# Patient Record
Sex: MALE | URBAN - METROPOLITAN AREA
[De-identification: names, ages, dates, MRNs, and addresses within clinical notes are randomized per-mention and may not be internally consistent; named-entity substitution may affect disease eponyms.]

---

## 2021-09-03 ENCOUNTER — OFFICE VISIT (OUTPATIENT)
Dept: URBAN - METROPOLITAN AREA CLINIC 75 | Facility: CLINIC | Age: 36
End: 2021-09-03
Payer: COMMERCIAL

## 2021-09-03 DIAGNOSIS — H01.002 BLEPHARITIS OF RIGHT LOWER EYELID: Primary | ICD-10-CM

## 2021-09-03 DIAGNOSIS — H01.005 BLEPHARITIS OF LEFT LOWER EYELID: ICD-10-CM

## 2021-09-03 DIAGNOSIS — H52.223 REGULAR ASTIGMATISM, BILATERAL: ICD-10-CM

## 2021-09-03 PROCEDURE — 99204 OFFICE O/P NEW MOD 45 MIN: CPT | Performed by: OPTOMETRIST

## 2021-09-03 ASSESSMENT — VISUAL ACUITY
OD: 20/20
OS: 20/20

## 2021-09-03 ASSESSMENT — INTRAOCULAR PRESSURE
OD: 15
OS: 17

## 2021-09-03 NOTE — IMPRESSION/PLAN
Impression: Blepharitis of right lower eyelid: H01.002. Plan: Blepharitis may improve with warm compresses, lid scrubs, and ophthalmic antibiotics. The patient should apply warm compress to affected eyes for 5 minutes at a time, 3 to 4 times a day. The patient should perform lid scrubs every night following the warm compress. Contact office if eyelid redness, crusting, discharge, or blurry vision does not improve or gets worse.

## 2021-09-03 NOTE — IMPRESSION/PLAN
Impression: Regular astigmatism, bilateral: H52.223- Refraction completed. Plan: New glasses Rx was given today.